# Patient Record
Sex: FEMALE | Race: WHITE
[De-identification: names, ages, dates, MRNs, and addresses within clinical notes are randomized per-mention and may not be internally consistent; named-entity substitution may affect disease eponyms.]

---

## 2019-01-28 ENCOUNTER — HOSPITAL ENCOUNTER (EMERGENCY)
Dept: HOSPITAL 56 - MW.ED | Age: 54
Discharge: HOME | End: 2019-01-28
Payer: SELF-PAY

## 2019-01-28 VITALS — DIASTOLIC BLOOD PRESSURE: 75 MMHG | SYSTOLIC BLOOD PRESSURE: 134 MMHG

## 2019-01-28 DIAGNOSIS — L03.113: ICD-10-CM

## 2019-01-28 DIAGNOSIS — Z88.0: ICD-10-CM

## 2019-01-28 DIAGNOSIS — F17.210: ICD-10-CM

## 2019-01-28 DIAGNOSIS — L40.9: Primary | ICD-10-CM

## 2019-01-28 DIAGNOSIS — Z88.7: ICD-10-CM

## 2019-01-28 DIAGNOSIS — L03.114: ICD-10-CM

## 2019-01-28 DIAGNOSIS — Z88.8: ICD-10-CM

## 2019-01-28 DIAGNOSIS — J44.9: ICD-10-CM

## 2019-01-28 DIAGNOSIS — Z88.2: ICD-10-CM

## 2019-01-28 NOTE — EDM.PDOC
ED HPI GENERAL MEDICAL PROBLEM





- General


Chief Complaint: Skin Complaint


Stated Complaint: PEELING HANDS


Time Seen by Provider: 01/28/19 12:29


Source of Information: Reports: Patient





- History of Present Illness


INITIAL COMMENTS - FREE TEXT/NARRATIVE: 





HISTORY AND PHYSICAL:


History of present illness:


[]


Patient presents with "peeling hands"





Bilateral hands are red and slightly tender she states they itch she does work 

at [employer] and wears latex gloves and also appears to have psoriasis likely 

her gloves or exacerbating the psoriasis she's been using multiple over-the-

counter lotions to no avail and certainly may have a latex allergy





She has no fever nausea vomiting chills sweats





Skin on bilateral hands affected from the tips of her fingers to her wrist in a 

glove distribution with scaling skin no fluctuance





Review of systems: 


As per history of present illness and below otherwise all systems reviewed and 

negative.


Past medical history: 


As per history of present illness and as reviewed below otherwise 

noncontributory.


Surgical history: 


As per history of present illness and as reviewed below otherwise 

noncontributory.


Social history: 


No reported history of drug or alcohol abuse.


Family history: 


As per history of present illness and as reviewed below otherwise 

noncontributory.


Physical exam:


HEENT: Atraumatic, normocephalic, pupils reactive, negative for conjunctival 

pallor or scleral icterus, mucous membranes moist, throat clear, neck supple, 

nontender, trachea midline.


Lungs: Clear to auscultation, breath sounds equal bilaterally, chest nontender.


Heart: S1S2, regular, negative for clicks, rubs, or JVD.


Abdomen: Soft, nondistended, nontender. Negative for masses or 

hepatosplenomegaly. Negative for costovertebral tenderness.


Pelvis: Stable nontender.


Genitourinary: Deferred.


Rectal: Deferred.


Extremities: Atraumatic, negative for cords or calf pain. Neurovascular 

unremarkable.


Neuro: Awake, alert, oriented. Cranial nerves II through XII unremarkable. 

Cerebellum unremarkable. Motor and sensory unremarkable throughout. Exam 

nonfocal.


Diagnostics:


[]Clinical





Therapeutics:


[Avoid latex


Cleocin 300 mg by mouth 3 times a day #30 no refill


Follow-up with primary care within 2 weeks ]


Impression: 


[Psoriasis


Clinically latex allergy]


Definitive disposition and diagnosis as appropriate pending reevaluation and 

review of above.





- Related Data


 Allergies











Allergy/AdvReac Type Severity Reaction Status Date / Time


 


influenza virus vaccine Allergy  Anaphylactic Verified 01/28/19 12:13





trivalent   Shock  


 


naproxen [From Aleve] Allergy  Anaphylactic Verified 01/28/19 12:13





   Shock  


 


Penicillins Allergy  Other Verified 03/01/15 13:54


 


Sulfa (Sulfonamide Allergy  Shortness Verified 04/11/16 20:43





Antibiotics)   of Breath  











Home Meds: 


 Home Meds





. [No Known Home Meds]  01/28/19 [History]











Past Medical History


HEENT History: Reports: Impaired Vision


Cardiovascular History: Reports: None


Respiratory History: Reports: COPD


OB/GYN History: Reports: None


Musculoskeletal History: Reports: None


Neurological History: Reports: Seizure


Psychiatric History: Reports: Anxiety, Depression


Immunologic History: Reports: None


Oncologic (Cancer) History: Reports: None





- Infectious Disease History


Infectious Disease History: Reports: Chicken Pox, Mumps





- Past Surgical History


Head Surgeries/Procedures: Reports: None


Other Musculoskeletal Surgeries/Procedures:: left knee x4, left ulnar tendon





Social & Family History





- Family History


Family Medical History: Noncontributory


HEENT: Reports: None


Cardiac: Reports: None





- Tobacco Use


Smoking Status *Q: Current Every Day Smoker


Years of Tobacco use: 10


Packs/Tins Daily: 0.6





- Caffeine Use


Caffeine Use: Reports: Coffee





- Recreational Drug Use


Recreational Drug Use: No





ED ROS GENERAL





- Review of Systems


Review Of Systems: See Below





ED EXAM, SKIN/RASH


Exam: See Below





Course





- Vital Signs


Last Recorded V/S: 





 Last Vital Signs











Temp  99.5 F   01/28/19 12:13


 


Pulse  122 H  01/28/19 12:13


 


Resp  18   01/28/19 12:13


 


BP  134/75   01/28/19 12:13


 


Pulse Ox  95   01/28/19 12:13














Departure





- Departure


Time of Disposition: 12:31


Disposition: Home, Self-Care 01


Condition: Good


Clinical Impression: 


 Cellulitis, Psoriasis








- Discharge Information


Referrals: 


PCP,None [Primary Care Provider] - 


Additional Instructions: 


The following information is given to patients seen in the emergency department 

who are being discharged to home. This information is to outline your options 

for follow-up care. We provide all patients seen in our emergency department 

with a follow-up referral.





The need for follow-up, as well as the timing and circumstances, are variable 

depending upon the specifics of your emergency department visit.





If you don't have a primary care physician on staff, we will provide you with a 

referral. We always advise you to contact your personal physician following an 

emergency department visit to inform them of the circumstance of the visit and 

for follow-up with them and/or the need for any referrals to a consulting 

specialist.





The emergency department will also refer you to a specialist when appropriate. 

This referral assures that you have the opportunity for follow-up care with a 

specialist. All of these measure are taken in an effort to provide you with 

optimal care, which includes your follow-up.





Under all circumstances we always encourage you to contact your private 

physician who remains a resource for coordinating your care. When calling for 

follow-up care, please make the office aware that this follow-up is from your 

recent emergency room visit. If for any reason you are refused follow-up, 

please contact the  emergency department at [phone] 

and asked to speak to the emergency department charge nurse.

## 2019-04-03 ENCOUNTER — HOSPITAL ENCOUNTER (EMERGENCY)
Dept: HOSPITAL 56 - MW.ED | Age: 54
Discharge: HOME | End: 2019-04-03
Payer: SELF-PAY

## 2019-04-03 VITALS — SYSTOLIC BLOOD PRESSURE: 138 MMHG | DIASTOLIC BLOOD PRESSURE: 80 MMHG

## 2019-04-03 DIAGNOSIS — Z88.7: ICD-10-CM

## 2019-04-03 DIAGNOSIS — L03.113: ICD-10-CM

## 2019-04-03 DIAGNOSIS — L25.9: Primary | ICD-10-CM

## 2019-04-03 DIAGNOSIS — Z88.8: ICD-10-CM

## 2019-04-03 DIAGNOSIS — L03.114: ICD-10-CM

## 2019-04-03 NOTE — EDM.PDOC
ED HPI GENERAL MEDICAL PROBLEM





- General


Chief Complaint: Skin Complaint


Stated Complaint: ALLERGIC REACTION ON HANDS


Time Seen by Provider: 04/03/19 14:25


Source of Information: Reports: Patient


History Limitations: Reports: No Limitations





- History of Present Illness


INITIAL COMMENTS - FREE TEXT/NARRATIVE: 


History of present illness:


[]Patient has had bilateral hand rash for a "very long time"that is worsening. 

Patient works at [employer] and wears latex free gloves while at work.





Review of systems: 


As per history of present illness and below otherwise all systems reviewed and 

negative.





Past medical history: 


As per history of present illness and as reviewed below otherwise 

noncontributory.





Surgical history: 


As per history of present illness and as reviewed below otherwise 

noncontributory.





Social history: 


No reported history of drug or alcohol abuse.





Family history: 


As per history of present illness and as reviewed below otherwise 

noncontributory.





Physical exam:


General: Well developed, well nourished in NAD


HEENT: Atraumatic, normocephalic, pupils reactive, negative for conjunctival 

pallor or scleral icterus, mucous membranes moist, throat clear, neck supple, 

nontender, trachea midline.


Lungs: Clear to auscultation, breath sounds equal bilaterally, chest nontender.


Heart: S1S2, regular, negative for clicks, rubs, or JVD.


Abdomen: NABS, Soft, nondistended, nontender. Negative for masses or 

hepatosplenomegaly. Negative for costovertebral tenderness.


Pelvis: Stable nontender.


Genitourinary: Deferred.


Rectal: Deferred.


Extremities: Bilateral hands are dry, flaky, erythematous with small punctate 

abrasions up to her wrists. Range of motion sensations intact brisk capillary 

refill,  Neurovascular unremarkable.


Neuro: Awake, alert, oriented. Cranial nerves II through XII unremarkable. 

Cerebellum unremarkable. Motor and sensory unremarkable throughout. Exam 

nonfocal.


Skin:warm and dry





Diagnostics:


None





Therapeutics:


none





ED Course:


Stable





Impression: 


Contact dermatitis with secondary cellulitis bilateral hands





Prescriptions:


Doxycycline





Plan:


All of the dermatology, Take meds as directed, follow up with your primary care 

physician, return to ER if symptoms worsen or change.





Definitive disposition and diagnosis as appropriate pending reevaluation and 

review of above.








- Related Data


 Allergies











Allergy/AdvReac Type Severity Reaction Status Date / Time


 


influenza virus vaccine Allergy  Anaphylactic Verified 04/03/19 14:42





trivalent   Shock  


 


naproxen [From Aleve] Allergy  Anaphylactic Verified 04/03/19 14:42





   Shock  


 


Penicillins Allergy  Other Verified 04/03/19 14:42


 


Sulfa (Sulfonamide Allergy  Shortness Verified 04/03/19 14:42





Antibiotics)   of Breath  











Home Meds: 


 Home Meds





. [No Known Home Meds]  04/03/19 [History]











Past Medical History


HEENT History: Reports: Impaired Vision


Cardiovascular History: Reports: None


Respiratory History: Reports: COPD


OB/GYN History: Reports: None


Musculoskeletal History: Reports: None


Neurological History: Reports: Seizure


Psychiatric History: Reports: Anxiety, Depression


Immunologic History: Reports: None


Oncologic (Cancer) History: Reports: None





- Infectious Disease History


Infectious Disease History: Reports: Chicken Pox, Mumps





- Past Surgical History


Head Surgeries/Procedures: Reports: None


Other Musculoskeletal Surgeries/Procedures:: left knee x4, left ulnar tendon





Social & Family History





- Family History


Family Medical History: Noncontributory


HEENT: Reports: None


Cardiac: Reports: None





- Caffeine Use


Caffeine Use: Reports: Coffee





ED ROS GENERAL





- Review of Systems


Review Of Systems: ROS reveals no pertinent complaints other than HPI.





ED EXAM, SKIN/RASH


Exam: See Below (See history of present illness)





Course





- Vital Signs


Last Recorded V/S: 


 Last Vital Signs











Temp  97.9 F   04/03/19 14:41


 


Pulse  93   04/03/19 14:41


 


Resp  18   04/03/19 14:41


 


BP  143/81 H  04/03/19 14:41


 


Pulse Ox  94 L  04/03/19 14:41














Departure





- Departure


Time of Disposition: 14:45


Disposition: Home, Self-Care 01


Condition: Good


Clinical Impression: 


Contact dermatitis


Qualifiers:


 Contact dermatitis type: unspecified Contact dermatitis trigger: unspecified 

trigger Qualified Code(s): L25.9 - Unspecified contact dermatitis, unspecified 

cause





Cellulitis


Qualifiers:


 Site of cellulitis: extremity Site of cellulitis of extremity: upper extremity 

Laterality: unspecified laterality Qualified Code(s): L03.119 - Cellulitis of 

unspecified part of limb








- Discharge Information


*PRESCRIPTION DRUG MONITORING PROGRAM REVIEWED*: No


*COPY OF PRESCRIPTION DRUG MONITORING REPORT IN PATIENT JOELLEN: No


Referrals: 


PCP,Unknown [Primary Care Provider] - 


Forms:  ED Department Discharge


Additional Instructions: 


The following information is given to patients seen in the emergency department 

who are being discharged to home. This information is to outline your options 

for follow-up care. We provide all patients seen in our emergency department 

with a follow-up referral.





The need for follow-up, as well as the timing and circumstances, are variable 

depending upon the specifics of your emergency department visit.





If you don't have a primary care physician on staff, we will provide you with a 

referral. We always advise you to contact your personal physician following an 

emergency department visit to inform them of the circumstance of the visit and 

for follow-up with them and/or the need for any referrals to a consulting 

specialist.





The emergency department will also refer you to a specialist when appropriate. 

This referral assures that you have the opportunity for follow-up care with a 

specialist. All of these measure are taken in an effort to provide you with 

optimal care, which includes your follow-up.





Under all circumstances we always encourage you to contact your private 

physician who remains a resource for coordinating your care. When calling for 

follow-up care, please make the office aware that this follow-up is from your 

recent emergency room visit. If for any reason you are refused follow-up, 

please contact the [facility] Emergency 

Department at [phone] and asked to speak to the emergency department 

charge nurse.





Take meds as directed, follow up with your primary care physician, return to ER 

if symptoms worsen or change.





[facility]


Primary Care


[address] [place] [id number]


Phone: [phone]


Fax: [phone]

## 2019-12-11 NOTE — EDM.PDOC
ED HPI GENERAL MEDICAL PROBLEM





- General


Chief Complaint: Eye Problems


Stated Complaint: RIGHT EYE PAIN


Time Seen by Provider: 12/11/19 07:13





- History of Present Illness


INITIAL COMMENTS - FREE TEXT/NARRATIVE: 


HISTORY AND PHYSICAL:





History of present illness:


Patient 53-year-old female who presents with concern of right eye pain she 

noticed this at work she describes it as somewhat sharp she does not recall or 

believe there is any foreign body patient denies any visual acuity changes she 

does wear glasses she denies history of glaucoma or other chronic conditions





Review of systems: 


As per history of present illness and below otherwise all systems reviewed and 

negative.





Past medical history: 


As per history of present illness and as reviewed below otherwise 

noncontributory.





Surgical history: 


As per history of present illness and as reviewed below otherwise 

noncontributory.





Social history: 


No reported history of drug or alcohol abuse.





Family history: 


As per history of present illness and as reviewed below otherwise 

noncontributory.





Physical exam:


HEENT: Atraumatic, normocephalic, pupils reactive, negative for conjunctival 

pallor or scleral icterus, mucous membranes moist, throat clear, neck supple, 

nontender, trachea midline. No foreign body corneal staining demonstrates 

corneal abrasion noted at the 7 o'clock position mild conjunctival injection 

anterior chamber clear visual acuity as per nursing notes


Lungs: Clear to auscultation, breath sounds equal bilaterally, chest nontender.


Heart: S1S2, regular, negative for clicks, rubs, or JVD.


Abdomen: Soft, nondistended, nontender. Negative for masses or 

hepatosplenomegaly. Negative for costovertebral tenderness.


Pelvis: Stable nontender.


Genitourinary: Deferred.


Rectal: Deferred.


Extremities: Atraumatic, negative for cords or calf pain. Neurovascular 

unremarkable.


Neuro: Awake, alert, oriented. Cranial nerves II through XII unremarkable. 

Cerebellum unremarkable. Motor and sensory unremarkable throughout. Exam 

nonfocal.





Diagnostics:


Visual acuity corneal staining





Therapeutics:


None





Impression: 


#1 right eye pain with corneal abrasion #2 medical screening exam #3 history of 

psoriasis #4 conjunctivitis





Definitive disposition and diagnosis as appropriate pending reevaluation and 

review of above.





  ** Right Eye


Pain Score (Numeric/FACES): 10





- Related Data


 Allergies











Allergy/AdvReac Type Severity Reaction Status Date / Time


 


influenza virus vaccine Allergy  Anaphylactic Verified 12/11/19 07:22





trivalent   Shock  


 


naproxen [From Aleve] Allergy  Anaphylactic Verified 12/11/19 07:22





   Shock  


 


Penicillins Allergy  Other Verified 12/11/19 07:22


 


Sulfa (Sulfonamide Allergy  Shortness Verified 12/11/19 07:22





Antibiotics)   of Breath  











Home Meds: 


 Home Meds





. [No Known Home Meds]  12/11/19 [History]











Past Medical History


HEENT History: Reports: Impaired Vision


Cardiovascular History: Reports: None


Respiratory History: Reports: COPD


OB/GYN History: Reports: None


Musculoskeletal History: Reports: None


Neurological History: Reports: Seizure


Psychiatric History: Reports: Anxiety, Depression


Immunologic History: Reports: None


Oncologic (Cancer) History: Reports: None





- Infectious Disease History


Infectious Disease History: Reports: Chicken Pox, Mumps





- Past Surgical History


Head Surgeries/Procedures: Reports: None


Other Musculoskeletal Surgeries/Procedures:: left knee x4, left ulnar tendon





Social & Family History





- Family History


Family Medical History: Noncontributory


HEENT: Reports: None


Cardiac: Reports: None





- Caffeine Use


Caffeine Use: Reports: Coffee





ED ROS GENERAL





- Review of Systems


Review Of Systems: Comprehensive ROS is negative, except as noted in HPI.





ED EXAM GENERAL W FULL EYE





- Physical Exam


Exam: See Below (dictation)





Course





- Vital Signs


Last Recorded V/S: 


 Last Vital Signs











Temp  36.5 C   12/11/19 07:23


 


Pulse  105 H  12/11/19 07:23


 


Resp  18   12/11/19 07:23


 


BP  123/74   12/11/19 07:23


 


Pulse Ox  95   12/11/19 07:23














- Orders/Labs/Meds


Meds: 


Medications














Discontinued Medications














Generic Name Dose Route Start Last Admin





  Trade Name Maurice  PRN Reason Stop Dose Admin


 


Tetracaine HCl  1 ml  12/11/19 07:22  





  Tetracaine 0.5% Steri-Unit Sol  EYERT  12/11/19 07:23  





  ASDIRECTED ONE   





     





     





     





     














Departure





- Departure


Time of Disposition: 07:38


Disposition: Home, Self-Care 01


Condition: Good


Clinical Impression: 


 Conjunctivitis, Corneal abrasion, Psoriasis








- Discharge Information


Referrals: 


PCP,None [Primary Care Provider] - 


Forms:  ED Department Discharge


Additional Instructions: 








The following information is given to patients seen in the emergency department 

who are being discharged to home. This information is to outline your options 

for follow-up care. We provide all patients seen in our emergency department 

with a follow-up referral.





The need for follow-up, as well as the timing and circumstances, are variable 

depending upon the specifics of your emergency department visit.





If you don't have a primary care physician on staff, we will provide you with a 

referral. We always advise you to contact your personal physician following an 

emergency department visit to inform them of the circumstance of the visit and 

for follow-up with them and/or the need for any referrals to a consulting 

specialist.





The emergency department will also refer you to a specialist when appropriate. 

This referral assures that you have the opportunity for followup care with a 

specialist. All of these measure are taken in an effort to provide you with 

optimal care, which includes your followup.





Under all circumstances we always encourage you to contact your private 

physician who remains a resource for coordinating  your care. When calling for 

followup care, please make the office aware that this follow-up is from your 

recent emergency room visit. If for any reason you are refused follow-up, 

please contact the [facility] emergency department at [phone] 

and asked to speak to the emergency department charge nurse





.[facility]


Opthamology Clinic


[address] [place] [id number]


Phone: [phone]


Fax: [phone]








[facility]


Primary Care


[address] [place] [address]


Phone: [phone]


Fax: [phone]























Erythromycin ophthalmic ointment as directed follow-up ophthalmology as 

discussed return as needed as discussed











Sepsis Event Note





- Focused Exam


Vital Signs: 


 Vital Signs











  Temp Pulse Resp BP Pulse Ox


 


 12/11/19 07:23  36.5 C  105 H  18  123/74  95











Date Exam was Performed: 12/11/19


Time Exam was Performed: 07:37